# Patient Record
Sex: FEMALE | Employment: FULL TIME | URBAN - METROPOLITAN AREA
[De-identification: names, ages, dates, MRNs, and addresses within clinical notes are randomized per-mention and may not be internally consistent; named-entity substitution may affect disease eponyms.]

---

## 2019-06-11 ENCOUNTER — HOSPITAL ENCOUNTER (EMERGENCY)
Facility: HOSPITAL | Age: 34
Discharge: HOME | End: 2019-06-11
Attending: EMERGENCY MEDICINE
Payer: MEDICAID

## 2019-06-11 VITALS
HEIGHT: 62 IN | RESPIRATION RATE: 17 BRPM | DIASTOLIC BLOOD PRESSURE: 70 MMHG | SYSTOLIC BLOOD PRESSURE: 125 MMHG | OXYGEN SATURATION: 98 % | WEIGHT: 220 LBS | BODY MASS INDEX: 40.48 KG/M2 | TEMPERATURE: 98 F | HEART RATE: 64 BPM

## 2019-06-11 DIAGNOSIS — K57.90 DIVERTICULOSIS OF INTESTINE WITHOUT BLEEDING, UNSPECIFIED INTESTINAL TRACT LOCATION: ICD-10-CM

## 2019-06-11 DIAGNOSIS — M62.830 BACK SPASM: Primary | ICD-10-CM

## 2019-06-11 LAB
B-HCG UR QL: NEGATIVE
BILIRUBIN, POC UA: NEGATIVE
BLOOD, POC UA: ABNORMAL
CLARITY, POC UA: ABNORMAL
COLOR, POC UA: ABNORMAL
CTP QC/QA: YES
GLUCOSE, POC UA: NEGATIVE
KETONES, POC UA: NEGATIVE
LEUKOCYTE EST, POC UA: NEGATIVE
NITRITE, POC UA: NEGATIVE
PH UR STRIP: 5.5 [PH]
PROTEIN, POC UA: ABNORMAL
SPECIFIC GRAVITY, POC UA: >=1.03
UROBILINOGEN, POC UA: 0.2 E.U./DL

## 2019-06-11 PROCEDURE — 81003 URINALYSIS AUTO W/O SCOPE: CPT | Mod: ER

## 2019-06-11 PROCEDURE — 81025 URINE PREGNANCY TEST: CPT | Mod: ER | Performed by: EMERGENCY MEDICINE

## 2019-06-11 PROCEDURE — 99284 EMERGENCY DEPT VISIT MOD MDM: CPT | Mod: ER

## 2019-06-11 RX ORDER — METHOCARBAMOL 750 MG/1
750 TABLET, FILM COATED ORAL EVERY 8 HOURS PRN
Qty: 20 TABLET | Refills: 0 | Status: SHIPPED | OUTPATIENT
Start: 2019-06-11

## 2019-06-11 NOTE — ED PROVIDER NOTES
Encounter Date: 6/11/2019    SCRIBE #1 NOTE: I, Jorge Luis Schulz, am scribing for, and in the presence of,  Toussaint Battley, FNP. I have scribed the following portions of the note - Other sections scribed: HPI, ROS, PE.       History     Chief Complaint   Patient presents with    Back Pain     pt reports bilateral back pain. Hx of kidney infxns and the s/sx are similar. Aching and crampy. Took Pamprin and Ibuprofen. Last dose of Ibuprofen and Tylenol at noon with minimal relief.     Patient admits to history of kidney infections.     The history is provided by the patient. No  was used.   Back Pain    This is a new problem. The problem occurs constantly. The problem has been unchanged. The pain is associated with no known injury. The pain is present in the lumbar spine (Right side). The quality of the pain is described as aching. Radiates to: Left side. Pertinent negatives include no chest pain, no fever, no numbness, no weight loss, no headaches, no abdominal pain, no abdominal swelling, no bowel incontinence, no perianal numbness, no bladder incontinence, no dysuria, no pelvic pain, no leg pain, no paresthesias, no paresis, no tingling and no weakness. Treatments tried: Ibuprofen and Tylenol. The treatment provided mild relief.     Review of patient's allergies indicates:  No Known Allergies  History reviewed. No pertinent past medical history.  No past surgical history on file.  History reviewed. No pertinent family history.  Social History     Tobacco Use    Smoking status: Not on file   Substance Use Topics    Alcohol use: Not on file    Drug use: Not on file     Review of Systems   Constitutional: Negative.  Negative for fever and weight loss.   HENT: Negative.  Negative for sore throat.    Eyes: Negative.    Respiratory: Negative.  Negative for shortness of breath.    Cardiovascular: Negative.  Negative for chest pain.   Gastrointestinal: Negative.  Negative for abdominal pain,  bowel incontinence, nausea and vomiting.   Endocrine: Negative.    Genitourinary: Negative.  Negative for bladder incontinence, dysuria and pelvic pain.   Musculoskeletal: Positive for back pain (right). Negative for myalgias.   Skin: Negative.  Negative for rash.   Allergic/Immunologic: Negative.    Neurological: Negative.  Negative for tingling, weakness, numbness, headaches and paresthesias.   Hematological: Negative.  Negative for adenopathy.   Psychiatric/Behavioral: Negative.  Negative for behavioral problems.   All other systems reviewed and are negative.      Physical Exam     Initial Vitals [06/11/19 1851]   BP Pulse Resp Temp SpO2   123/77 85 17 98.3 °F (36.8 °C) 100 %      MAP       --         Physical Exam    Nursing note and vitals reviewed.  Constitutional: Vital signs are normal. She appears well-developed and well-nourished.   HENT:   Head: Normocephalic and atraumatic.   Right Ear: External ear normal.   Left Ear: External ear normal.   Nose: Nose normal.   Mouth/Throat: Oropharynx is clear and moist.   Eyes: EOM are normal. Pupils are equal, round, and reactive to light.   Neck: Normal range of motion. Neck supple.   Cardiovascular: Normal rate, regular rhythm, normal heart sounds, intact distal pulses and normal pulses. Exam reveals no gallop, no friction rub and no decreased pulses.    No murmur heard.  Pulmonary/Chest: Effort normal and breath sounds normal. No respiratory distress. She has no wheezes. She has no rhonchi. She has no rales. She exhibits no tenderness.   Abdominal: Soft. Normal appearance. There is CVA tenderness. There is no rigidity.   Neurological: She is alert and oriented to person, place, and time. No cranial nerve deficit.   Skin: Skin is warm and dry. Capillary refill takes less than 2 seconds. No rash noted.   Psychiatric: She has a normal mood and affect.         ED Course   Procedures  Labs Reviewed   POCT URINALYSIS W/O SCOPE - Abnormal; Notable for the following  components:       Result Value    Glucose, UA Negative (*)     Bilirubin, UA Negative (*)     Ketones, UA Negative (*)     Spec Grav UA >=1.030 (*)     Blood, UA 3+ (*)     Protein, UA 1+ (*)     Nitrite, UA Negative (*)     Leukocytes, UA Negative (*)     All other components within normal limits   POCT URINALYSIS W/O SCOPE   POCT URINE PREGNANCY          Imaging Results          CT Renal Stone Study ABD Pelvis WO (Final result)  Result time 06/11/19 20:19:33    Final result by Blue Oliva MD (06/11/19 20:19:33)                 Impression:      1. No findings to suggest obstructive uropathy.  2. Low attenuating nodule within the right lower lobe, attenuation of which suggests small focus of fat eventration.  Short-term follow-up, however, is recommended as underlying nodule not entirely excluded.  Comparison with any previous exams would be helpful.  3. Colonic diverticulosis without diverticulitis.  4. Several additional findings above.      Electronically signed by: Blue Oliva MD  Date:    06/11/2019  Time:    20:19             Narrative:    EXAMINATION:  CT RENAL STONE STUDY ABD PELVIS WO    CLINICAL HISTORY:  Flank pain, stone disease suspected;    TECHNIQUE:  Low dose axial images, sagittal and coronal reformations were obtained from the lung bases to the pubic symphysis.  Contrast was not administered.    COMPARISON:  Images of the lower thorax are remarkable for a nodular focus abutting the right hemidiaphragm, measuring 0.7 cm, nonspecific, may reflect small pulmonary nodule or small focus of fat eventration.  There is a moderate hiatal hernia.    The liver is enlarged, correlation with LFTs recommended.  The spleen, pancreas, gallbladder and adrenal glands have a grossly unremarkable noncontrast appearance.  There is no biliary dilation or ascites.  The pancreatic duct is not dilated.  No significant abdominal lymphadenopathy.    The kidneys have a grossly unremarkable noncontrast appearance  without hydronephrosis or nephrolithiasis.  The bilateral ureters are unable to be followed in their entirety to the urinary bladder, no definite calculi seen, no secondary findings to suggest obstructive uropathy.    The uterus and adnexa is grossly unremarkable.  There are a few scattered colonic diverticula without inflammation.  The terminal ileum is unremarkable.  Surgical changes are noted suggesting appendectomy.  The small bowel is grossly unremarkable.  There are a few scattered shotty periaortic and paracaval lymph nodes.  No focal organized pelvic fluid collection.    No focal osseous destructive process.                                 Medical Decision Making:   Initial Assessment:   Back spasm, diverticulosis  Differential Diagnosis:   Kidney stone, pyelonephritis, tumor  Clinical Tests:   Lab Tests: Ordered and Reviewed  The following lab test(s) were unremarkable: Urinalysis and UPT  Radiological Study: Ordered and Reviewed  ED Management:  Imaging on revealed the presence of diverticulosis.  Patient will be discharged home on Robaxin for back spasms with instructions to eat a high-fiber diet given her diagnosis of diverticulosis, follow up with her primary care provider tomorrow, refrain from strenuous activity, use over-the-counter heating pad and or icy Hot as needed, and return to the ER as needed if symptoms worsen or fail to improve.  The patient verbalized and understanding of discharge instructions and treatment plan.            Scribe Attestation:   Scribe #1: I performed the above scribed service and the documentation accurately describes the services I performed. I attest to the accuracy of the note.               Clinical Impression:     1. Back spasm    2. Diverticulosis of intestine without bleeding, unspecified intestinal tract location                                   Toussaint Battley III, [facility]  06/11/19 2298

## 2019-06-12 NOTE — ED NOTES
Report received, care assumed, pt resting, sitting in chair, pt given gown and asked to change for CT scan, pt ambulated to RR, no s/s of distress noted, will continue to monitor

## 2020-03-16 ENCOUNTER — HOSPITAL ENCOUNTER (EMERGENCY)
Facility: HOSPITAL | Age: 35
Discharge: HOME | End: 2020-03-16
Attending: EMERGENCY MEDICINE
Payer: MEDICAID

## 2020-03-16 VITALS
BODY MASS INDEX: 38.64 KG/M2 | TEMPERATURE: 98 F | HEART RATE: 98 BPM | RESPIRATION RATE: 16 BRPM | OXYGEN SATURATION: 98 % | HEIGHT: 62 IN | WEIGHT: 210 LBS | DIASTOLIC BLOOD PRESSURE: 63 MMHG | SYSTOLIC BLOOD PRESSURE: 106 MMHG

## 2020-03-16 DIAGNOSIS — J18.9 PNEUMONIA OF BOTH LOWER LOBES DUE TO INFECTIOUS ORGANISM: Primary | ICD-10-CM

## 2020-03-16 DIAGNOSIS — R05.9 COUGH: ICD-10-CM

## 2020-03-16 LAB
B-HCG UR QL: NEGATIVE
CTP QC/QA: YES
CTP QC/QA: YES
POC MOLECULAR INFLUENZA A AGN: NEGATIVE
POC MOLECULAR INFLUENZA B AGN: NEGATIVE

## 2020-03-16 PROCEDURE — 81025 URINE PREGNANCY TEST: CPT | Mod: ER | Performed by: NURSE PRACTITIONER

## 2020-03-16 PROCEDURE — 87502 INFLUENZA DNA AMP PROBE: CPT | Mod: ER

## 2020-03-16 PROCEDURE — 99284 EMERGENCY DEPT VISIT MOD MDM: CPT | Mod: 25,ER

## 2020-03-16 RX ORDER — LEVOFLOXACIN 500 MG/1
500 TABLET, FILM COATED ORAL DAILY
Qty: 10 TABLET | Refills: 0 | Status: SHIPPED | OUTPATIENT
Start: 2020-03-16 | End: 2020-03-26

## 2020-03-16 RX ORDER — BENZONATATE 100 MG/1
100 CAPSULE ORAL 3 TIMES DAILY PRN
Qty: 20 CAPSULE | Refills: 0 | Status: SHIPPED | OUTPATIENT
Start: 2020-03-16 | End: 2020-03-26

## 2020-03-16 NOTE — ED PROVIDER NOTES
Encounter Date: 3/16/2020    SCRIBE #1 NOTE: I, Gala Sheth, am scribing for, and in the presence of,  KATHERINE Kinney. I have scribed the following portions of the note - Other sections scribed: HPI, ROS, PE.       History     Chief Complaint   Patient presents with    Fever    Generalized Body Aches     Sohail Blanchard is a 34 y.o. female who presents to the ED complaining of generalized body aches x 3 days. Reports fever, chills, nausea and 1 episode of vomiting. Denies CP and SOB. No PMHx or PSHx. No known allergies.     The history is provided by the patient. No  was used.     Review of patient's allergies indicates:  No Known Allergies  No past medical history on file.  No past surgical history on file.  No family history on file.  Social History     Tobacco Use    Smoking status: Not on file   Substance Use Topics    Alcohol use: Not on file    Drug use: Not on file     Review of Systems   Constitutional: Positive for chills and fever.   Respiratory: Negative for shortness of breath.    Cardiovascular: Negative for chest pain.   Gastrointestinal: Positive for nausea and vomiting.   Musculoskeletal: Positive for myalgias.   All other systems reviewed and are negative.      Physical Exam     Initial Vitals [03/16/20 1304]   BP Pulse Resp Temp SpO2   106/63 106 16 99.2 °F (37.3 °C) 98 %      MAP       --         Physical Exam    Nursing note and vitals reviewed.  Constitutional: She appears well-developed and well-nourished.   HENT:   Head: Normocephalic and atraumatic.   Eyes: Conjunctivae are normal.   Neck: Normal range of motion. Neck supple.   Cardiovascular: Normal rate and intact distal pulses.   Pulmonary/Chest: Effort normal. No respiratory distress.   Musculoskeletal: Normal range of motion.   Neurological: She is alert and oriented to person, place, and time.   Skin: Skin is warm and dry.   Psychiatric: She has a normal mood and affect.         ED Course    Procedures  Labs Reviewed   POCT URINE PREGNANCY   POCT INFLUENZA A/B MOLECULAR          Imaging Results           X-Ray Chest PA And Lateral (Final result)  Result time 03/16/20 16:30:04    Final result by Garret Fairchild MD (03/16/20 16:30:04)                 Impression:      Findings concerning for bibasilar airspace disease including aspiration or pneumonia.  Short-term follow-up chest radiography after therapy is recommended to resolution.    Suspected hiatal hernia.    This report was flagged in Epic as abnormal.      Electronically signed by: Garret Fairchild MD  Date:    03/16/2020  Time:    16:30             Narrative:    EXAMINATION:  XR CHEST PA AND LATERAL    CLINICAL HISTORY:  Cough    TECHNIQUE:  PA and lateral views of the chest were performed.    COMPARISON:  CT renal stone study 06/11/2019    FINDINGS:  Subtle opacities within the bilateral lower lobes, left greater than right.  Lungs are otherwise symmetrically well expanded without pleural effusion or pneumothorax.    The cardiac silhouette is normal in size. Lower left mediastinum convex opacity on the frontal view likely corresponding to known hiatal hernia in this region..  Upper mediastinal contours are within normal limits.  Pulmonary vasculature and hilar contours are within normal limits.    Bones are intact.                                 Medical Decision Making:   History:   Old Medical Records: I decided to obtain old medical records.  Initial Assessment:   Patient has bilateral lower lobe pneumonia on x-ray.  Patient is afebrile and not hypoxic.  Influenza screen is negative.  I will discharge patient home on Levaquin and Tessalon Perles.  Patient instructed to return to ED if symptoms worsen.  Patient is stable for discharge.  Clinical Tests:   Lab Tests: Ordered and Reviewed            Scribe Attestation:   Scribe #1: I performed the above scribed service and the documentation accurately describes the services I performed. I attest to  the accuracy of the note.    The document was produced by a scribe under my direction and in my presence.  I agree with the content of the note and have made any necessary edits.    Phoebe MURPHY                       Clinical Impression:     1. Pneumonia of both lower lobes due to infectious organism    2. Cough            Disposition:   Disposition: Discharged  Condition: Stable     ED Disposition Condition    Discharge Stable        ED Prescriptions     Medication Sig Dispense Start Date End Date Auth. Provider    levoFLOXacin (LEVAQUIN) 500 MG tablet Take 1 tablet (500 mg total) by mouth once daily. for 10 days 10 tablet 3/16/2020 3/26/2020 KATHERINE Key    benzonatate (TESSALON) 100 MG capsule Take 1 capsule (100 mg total) by mouth 3 (three) times daily as needed. 20 capsule 3/16/2020 3/26/2020 KATHERINE Key        Follow-up Information     Follow up With Specialties Details Why Contact Info    Aj Swartz MD General Practice   [address] [place] [place] [address]  Barbara ESTRELLA [id number]  [phone]                                       KATHERINE Key  03/17/20 0007

## 2020-03-16 NOTE — ED TRIAGE NOTES
34 y.o. Female to ED with c.o. Generalized body aches, subjective fever, and mild cough x 2 days. Patient denies shortness of breath, denies chest pain. Patient endorses nausea and one episode of vomiting. Patient denies diarrhea. Patient states she may have been exposed to someone with influenza.